# Patient Record
(demographics unavailable — no encounter records)

---

## 2024-12-27 NOTE — PHYSICAL EXAM
[Alert] : alert [Oriented x 3] : ~L oriented x 3 [FreeTextEntry3] : - b/l scaly feet patches with OD, improved as per patient - xerotic eryth patches worst on arms, legs and back, no dermatographism - no active herpetic eruption on buttocks

## 2024-12-27 NOTE — HISTORY OF PRESENT ILLNESS
[FreeTextEntry1] : 57yo F here for follow up for recurring HSV lesion on L upper buttock with new itchy rashes on extremities and back [de-identified] : 55yo F with hx of HSV for many years presenting c/o tender L upper buttock eruption that keeps coming back, pt here for follow up. Pt says that she had almost 1 eruption a month since last visit in June, including eruptions in the 2 weeks. Pt says she takes valacyclovir 500 mg bid x 3-4 days when she feels an eruption starting and it goes away. Pt prefers not to take daily suppression dose because the cost of the pills are too high. Pt also uses Neosporin on lesions.  Pt has also been treating b/l scaly patches on feet with topical clotrimazole, helping and requests refill.  She also complains from diffuse itchy rash on the extremities and back when she gets exposed to cold, better when it gets warmer.  No other skin complaints, not pregnant, no plans.

## 2024-12-27 NOTE — HISTORY OF PRESENT ILLNESS
[FreeTextEntry1] : 57yo F here for follow up for recurring HSV lesion on L upper buttock with new itchy rashes on extremities and back [de-identified] : 57yo F with hx of HSV for many years presenting c/o tender L upper buttock eruption that keeps coming back, pt here for follow up. Pt says that she had almost 1 eruption a month since last visit in June, including eruptions in the 2 weeks. Pt says she takes valacyclovir 500 mg bid x 3-4 days when she feels an eruption starting and it goes away. Pt prefers not to take daily suppression dose because the cost of the pills are too high. Pt also uses Neosporin on lesions.  Pt has also been treating b/l scaly patches on feet with topical clotrimazole, helping and requests refill.  She also complains from diffuse itchy rash on the extremities and back when she gets exposed to cold, better when it gets warmer.  No other skin complaints, not pregnant, no plans.

## 2024-12-27 NOTE — ASSESSMENT
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

## 2025-07-18 NOTE — HISTORY OF PRESENT ILLNESS
[FreeTextEntry1] : 58 y/o P2 referred for unsatisfactory PAP  Per patient this has happened more than once. Reports scanned in state that there were insufficient squamous cells, HR HPV test was negative. No other complaints  POBx NSVDx2 PGYN menopausal 3 years, no prior cysts, fibroids STDs, no prior PAP abnormalities PMH no PSH BTL NKDAs FHx neg for ca

## 2025-07-18 NOTE — REVIEW OF SYSTEMS
[Negative] : Musculoskeletal [Hot Flashes] : hot flashes [Rash] : no rash noted [Ulcer] : no ulcer was seen [Syncope] : no syncope noted [Neuropathy] : no neuropathy [Depression] : no depression [Abn Vag Bleeding] : no abnormal vaginal bleeding [Fatigue] : no fatigue [Recent Wt Gain ___ Lbs] : no recent weight gain [Recent Wt Loss___ Lbs] : no recent weight loss [Chest Pain] : no chest pain [BONILLA] : no dyspnea on exertion [Wheezing] : no wheezing [SOB on Exertion] : no shortness of breath during exertion [Bloody Stools] : no bloody stools [Nausea] : no nausea/vomitting [Muscle Weakness] : no muscle weakness

## 2025-07-18 NOTE — PHYSICAL EXAM
[Absent] : CVAT: absent [Normal] : Recto-Vaginal Exam: Normal [Fully active, able to carry on all pre-disease performance without restriction] : Status 0 - Fully active, able to carry on all pre-disease performance without restriction [FreeTextEntry1] : NAD [de-identified] : LUCIA [de-identified] : no edema [de-identified] : soft NT/ND [de-identified] : no lesions [de-identified] : mild prolapse [de-identified] : 3x3 cm os stenotic, dilated with cytobrush [de-identified] : 8-9 weeks [de-identified] : no masses